# Patient Record
Sex: FEMALE | Race: WHITE | NOT HISPANIC OR LATINO | Employment: STUDENT | URBAN - METROPOLITAN AREA
[De-identification: names, ages, dates, MRNs, and addresses within clinical notes are randomized per-mention and may not be internally consistent; named-entity substitution may affect disease eponyms.]

---
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
## 2019-01-14 ENCOUNTER — TELEPHONE (OUTPATIENT)
Dept: OBSTETRICS AND GYNECOLOGY | Age: 19
End: 2019-01-14

## 2019-01-14 NOTE — TELEPHONE ENCOUNTER
Called pt. She has just been on pills for 3 months. She has not yet noted a significant improvement in flow and cramping. She is not having any issues with pills otherwise. Discussed that it may take additional months to note a significant improvement in flow and cramps. Recommend she consider continuing a few more months. She denies any significant headaches or side effects and agrees with this plan. Also instructed pt she has refills at pharmacy.

## 2019-01-28 ENCOUNTER — TELEPHONE (OUTPATIENT)
Dept: OBSTETRICS AND GYNECOLOGY | Age: 19
End: 2019-01-28

## 2019-01-28 NOTE — TELEPHONE ENCOUNTER
Dr Delgado pt started a new bcp, this is 4th month, has been exp low abd cramping different than menstr cramping, leaving this Thursday for 6 mos, wants to know if she can be seen before then. Would you want an US with this appt? Please advise

## 2019-01-29 ENCOUNTER — PROCEDURE VISIT (OUTPATIENT)
Dept: OBSTETRICS AND GYNECOLOGY | Age: 19
End: 2019-01-29

## 2019-01-29 ENCOUNTER — OFFICE VISIT (OUTPATIENT)
Dept: OBSTETRICS AND GYNECOLOGY | Age: 19
End: 2019-01-29

## 2019-01-29 VITALS
BODY MASS INDEX: 18.66 KG/M2 | DIASTOLIC BLOOD PRESSURE: 70 MMHG | SYSTOLIC BLOOD PRESSURE: 110 MMHG | HEIGHT: 65 IN | WEIGHT: 112 LBS

## 2019-01-29 DIAGNOSIS — N94.6 DYSMENORRHEA: Primary | ICD-10-CM

## 2019-01-29 DIAGNOSIS — R10.30 LOWER ABDOMINAL PAIN: Primary | ICD-10-CM

## 2019-01-29 DIAGNOSIS — N94.6 DYSMENORRHEA: ICD-10-CM

## 2019-01-29 DIAGNOSIS — R10.30 LOWER ABDOMINAL PAIN: ICD-10-CM

## 2019-01-29 LAB
B-HCG UR QL: NEGATIVE
BILIRUB BLD-MCNC: NEGATIVE MG/DL
CLARITY, POC: CLEAR
COLOR UR: YELLOW
GLUCOSE UR STRIP-MCNC: NEGATIVE MG/DL
INTERNAL NEGATIVE CONTROL: NEGATIVE
INTERNAL POSITIVE CONTROL: POSITIVE
KETONES UR QL: NEGATIVE
LEUKOCYTE EST, POC: NEGATIVE
Lab: NORMAL
NITRITE UR-MCNC: NEGATIVE MG/ML
PH UR: 5.5 [PH] (ref 5–8)
PROT UR STRIP-MCNC: ABNORMAL MG/DL
RBC # UR STRIP: NEGATIVE /UL
SP GR UR: 1.02 (ref 1–1.03)
UROBILINOGEN UR QL: NORMAL

## 2019-01-29 PROCEDURE — 81003 URINALYSIS AUTO W/O SCOPE: CPT | Performed by: PHYSICIAN ASSISTANT

## 2019-01-29 PROCEDURE — 76830 TRANSVAGINAL US NON-OB: CPT | Performed by: OBSTETRICS & GYNECOLOGY

## 2019-01-29 PROCEDURE — 81025 URINE PREGNANCY TEST: CPT | Performed by: PHYSICIAN ASSISTANT

## 2019-01-29 PROCEDURE — 99213 OFFICE O/P EST LOW 20 MIN: CPT | Performed by: PHYSICIAN ASSISTANT

## 2019-01-29 RX ORDER — NORETHINDRONE ACETATE AND ETHINYL ESTRADIOL AND FERROUS FUMARATE 1MG-20(24)
1 KIT ORAL DAILY
Qty: 84 TABLET | Refills: 3 | Status: SHIPPED | OUTPATIENT
Start: 2019-01-29 | End: 2019-08-14 | Stop reason: SDUPTHER

## 2019-01-29 RX ORDER — METHYLPHENIDATE HYDROCHLORIDE 54 MG/1
TABLET ORAL
Refills: 0 | COMMUNITY
Start: 2019-01-24

## 2019-01-29 NOTE — PROGRESS NOTES
"Subjective     Chief Complaint   Patient presents with   • Gynecologic Exam     c/o low abdominal cramping       Hitesh Garcia is a 19 y.o.  whose LMP is Patient's last menstrual period was 2019 (approximate). presents with pelvic pain    Pt is on BCP  Been on it for 3 months, just started her 4th month  started noting right lower quadrant discomfort in the past week  First noted it while lying on her side  Started getting worse over the weekend  No nausea/vomiting or fever.  Stops and starts on it's own  Not treated with otc meds  No h/o this    Pt of Dr Guardado  New to me with this concern    No Additional Complaints Reported    The following portions of the patient's history were reviewed and updated as appropriate:vital signs, allergies, current medications, past family history, past medical history, past social history, past surgical history and problem list      Review of Systems   Genitourinary:positive for pelvic pain on the right side     Objective      /70   Ht 163.8 cm (64.5\")   Wt 50.8 kg (112 lb)   LMP 2019 (Approximate)   Breastfeeding? No   BMI 18.93 kg/m²     Physical Exam    General:   alert, comfortable and no distress   Heart: Not performed today   Lungs: Not performed today.   Breast: Not performed today   Neck: na   Abdomen: BS nl in all 4 quadrants, non tender to palpation and neg SLR   CVA: Not performed today   Pelvis: Not performed today   Extremities: Not performed today   Neurologic: negative   Psychiatric: Normal affect, judgement, and mood       Lab Review   Labs: Urinalysis - with micro proteinuria noted    Imaging   Ultrasound - Pelvic Vaginal  Endo lining measures 5.26 mm.  b ovaries wnl    Assessment/Plan     ASSESSMENT  1. Lower abdominal pain    2. Dysmenorrhea        PLAN  1.   Orders Placed This Encounter   Procedures   • Urine Culture - Urine, Urine, Random Void   • POC Pregnancy, Urine   • POC Urinalysis Dipstick, Multipro   • CBC & Differential " "      2. Medications prescribed this encounter:        New Medications Ordered This Visit   Medications   • norethindrone-ethinyl estradiol-ferrous fumarate (LOESTIN 24 FE) 1-20 MG-MCG(24) per tablet     Sig: Take 1 tablet by mouth Daily.     Dispense:  84 tablet     Refill:  3     Take continuously so fill sufficiently       3. Discussed that U/S is reassuring but plan to r/o acute infection with a CBC and also r/o UTI.  Discussed options and plan to take pill continuously to see if that helps to decrease her discomfort. Also discussed ibuprofen prn pain.  Disc that if acute pain occurs or if  nausea and vomiting of fever    Disc SA. 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

## 2019-01-30 LAB
BASOPHILS # BLD AUTO: 0 X10E3/UL (ref 0–0.2)
BASOPHILS NFR BLD AUTO: 0 %
EOSINOPHIL # BLD AUTO: 0.1 X10E3/UL (ref 0–0.4)
EOSINOPHIL NFR BLD AUTO: 1 %
ERYTHROCYTE [DISTWIDTH] IN BLOOD BY AUTOMATED COUNT: 13.1 % (ref 12.3–15.4)
HCT VFR BLD AUTO: 39.9 % (ref 34–46.6)
HGB BLD-MCNC: 13.9 G/DL (ref 11.1–15.9)
IMM GRANULOCYTES # BLD AUTO: 0 X10E3/UL (ref 0–0.1)
IMM GRANULOCYTES NFR BLD AUTO: 0 %
LYMPHOCYTES # BLD AUTO: 1.3 X10E3/UL (ref 0.7–3.1)
LYMPHOCYTES NFR BLD AUTO: 17 %
MCH RBC QN AUTO: 30.2 PG (ref 26.6–33)
MCHC RBC AUTO-ENTMCNC: 34.8 G/DL (ref 31.5–35.7)
MCV RBC AUTO: 87 FL (ref 79–97)
MONOCYTES # BLD AUTO: 0.6 X10E3/UL (ref 0.1–0.9)
MONOCYTES NFR BLD AUTO: 8 %
NEUTROPHILS # BLD AUTO: 6 X10E3/UL (ref 1.4–7)
NEUTROPHILS NFR BLD AUTO: 74 %
PLATELET # BLD AUTO: 243 X10E3/UL (ref 150–379)
RBC # BLD AUTO: 4.6 X10E6/UL (ref 3.77–5.28)
WBC # BLD AUTO: 8 X10E3/UL (ref 3.4–10.8)

## 2019-01-31 LAB
BACTERIA UR CULT: NORMAL
BACTERIA UR CULT: NORMAL

## 2019-08-14 DIAGNOSIS — N94.6 DYSMENORRHEA: ICD-10-CM

## 2019-08-14 RX ORDER — NORETHINDRONE ACETATE AND ETHINYL ESTRADIOL AND FERROUS FUMARATE 1MG-20(24)
1 KIT ORAL DAILY
Qty: 84 TABLET | Refills: 1 | Status: SHIPPED | OUTPATIENT
Start: 2019-08-14 | End: 2020-01-20

## 2020-01-17 ENCOUNTER — TELEPHONE (OUTPATIENT)
Dept: OBSTETRICS AND GYNECOLOGY | Age: 20
End: 2020-01-17

## 2020-01-17 NOTE — TELEPHONE ENCOUNTER
PT states she has checked several Pharm and her BC is on Back order or has been D/C.  She sched appt  For when she returns from school in May.  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 Pt aware Dr. CHINCHILLA not in office today conf Pharm @ [facility].  Pls advise    LINDA # [phone]

## 2020-01-20 ENCOUNTER — TELEPHONE (OUTPATIENT)
Dept: OBSTETRICS AND GYNECOLOGY | Age: 20
End: 2020-01-20

## 2020-01-20 RX ORDER — NORETHINDRONE ACETATE AND ETHINYL ESTRADIOL 1; .02 MG/1; MG/1
1 TABLET ORAL DAILY
Qty: 28 TABLET | Refills: 4 | Status: SHIPPED | OUTPATIENT
Start: 2020-01-20 | End: 2020-04-08

## 2020-01-20 NOTE — TELEPHONE ENCOUNTER
Pt called and requested change in pill due to availability. Was on loestrin 24. Changed to lo estrin 1/20.

## 2020-01-20 NOTE — TELEPHONE ENCOUNTER
Patient called and is having trouble getting LoEstrin from her pharmacy .  They are out of stock and do not know when they will get it in.  Is wanting to know if you call in another birth control?

## 2020-01-20 NOTE — TELEPHONE ENCOUNTER
I changed to loestrin 1/20. Pt was on loestrin 24. There should be many formulations for the newly prescribed pill

## 2020-04-08 RX ORDER — NORETHINDRONE ACETATE AND ETHINYL ESTRADIOL 1; 20 MG/1; UG/1
TABLET ORAL
Qty: 21 TABLET | Refills: 0 | Status: SHIPPED | OUTPATIENT
Start: 2020-04-08 | End: 2020-04-28

## 2020-04-28 RX ORDER — NORETHINDRONE ACETATE AND ETHINYL ESTRADIOL 1; 20 MG/1; UG/1
TABLET ORAL
Qty: 21 TABLET | Refills: 0 | Status: SHIPPED | OUTPATIENT
Start: 2020-04-28 | End: 2020-05-18

## 2020-05-18 RX ORDER — NORETHINDRONE ACETATE AND ETHINYL ESTRADIOL 1; 20 MG/1; UG/1
TABLET ORAL
Qty: 21 TABLET | Refills: 0 | Status: SHIPPED | OUTPATIENT
Start: 2020-05-18 | End: 2020-05-19 | Stop reason: SDUPTHER

## 2020-05-19 ENCOUNTER — OFFICE VISIT (OUTPATIENT)
Dept: OBSTETRICS AND GYNECOLOGY | Age: 20
End: 2020-05-19

## 2020-05-19 VITALS
WEIGHT: 118.6 LBS | HEIGHT: 65 IN | BODY MASS INDEX: 19.76 KG/M2 | SYSTOLIC BLOOD PRESSURE: 130 MMHG | DIASTOLIC BLOOD PRESSURE: 80 MMHG

## 2020-05-19 DIAGNOSIS — Z01.419 ENCOUNTER FOR GYNECOLOGICAL EXAMINATION: Primary | ICD-10-CM

## 2020-05-19 DIAGNOSIS — N94.6 DYSMENORRHEA: ICD-10-CM

## 2020-05-19 PROCEDURE — 99395 PREV VISIT EST AGE 18-39: CPT | Performed by: OBSTETRICS & GYNECOLOGY

## 2020-05-19 RX ORDER — LEVOCETIRIZINE DIHYDROCHLORIDE 5 MG/1
5 TABLET, FILM COATED ORAL EVERY EVENING
COMMUNITY

## 2020-05-19 RX ORDER — FLUTICASONE PROPIONATE 50 MCG
2 SPRAY, SUSPENSION (ML) NASAL DAILY
COMMUNITY

## 2020-05-19 RX ORDER — NORETHINDRONE ACETATE AND ETHINYL ESTRADIOL 1; .02 MG/1; MG/1
1 TABLET ORAL DAILY
Qty: 21 TABLET | Refills: 14 | Status: SHIPPED | OUTPATIENT
Start: 2020-05-19 | End: 2021-04-22

## 2020-05-19 NOTE — PROGRESS NOTES
".  Subjective     Chief Complaint   Patient presents with   • Gynecologic Exam     ae       History of Present Illness    Hitesh Garcia is a 20 y.o.  who presents for annual exam  She notes she does not have menses while taking active ocp's continuously but will occasionally have spotting  Pt is not sexually active. She is taking pills to help with flow and cramping.  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 (Menstrual status: Oral contraceptives).         Current contraception: abstinence  History of abnormal Pap smear: no  Received Gardasil immunization: yes  Perform regular self breast exam: yes - reg  Family history of uterine or ovarian cancer: no  Family History of colon cancer: no  Family history of breast cancer: yes - PGM after menopause    Mammogram: not indicated.  Colonoscopy: not indicated.  DEXA: not indicated.    Exercise: moderately active  Calcium/Vitamin D: adequate intake    The following portions of the patient's history were reviewed and updated as appropriate: allergies, current medications, past family history, past medical history, past social history, past surgical history and problem list.    Review of Systems    Review of Systems   Constitutional: Negative for fatigue.   Respiratory: Negative for shortness of breath.    Gastrointestinal: Negative for abdominal pain.   Genitourinary: Pos for missed menses with continuous pill use,Negative for dysmenorrhea on ocp's  Neurological: Negative for headaches.   Psychiatric/Behavioral: Negative for dysphoric mood.         Objective   Physical Exam    /80   Ht 163.8 cm (64.5\")   " Wt 53.8 kg (118 lb 9.6 oz)   BMI 20.04 kg/m²   General:   alert and no distress   Heart: regular rate and rhythm   Lungs: clear to auscultation bilaterally   Breast: no masses, retractions, nipple discharge or axillary adenopathy   Neck: Supple, no thyromegaly   Abdomen: Soft, no guarding/rebound    No focal tenderness   Pelvis: External genitalia: normal general appearance  Urinary system: urethral meatus normal  Vaginal: normal mucosa without prolapse or lesions  Cervix: normal appearance  Adnexa: no masses or tenderness  Uterus: normal, nontender   Extremities: Extremities normal, atraumatic, no cyanosis or edema   Neurologic: Alert and oriented   Psychiatric: Normal affect, judgement, and mood   MA present throughout exam    Assessment/Plan   Hitesh was seen today for gynecologic exam.    Diagnoses and all orders for this visit:    Encounter for gynecological examination  -     IGP, Rfx Aptima HPV ASCU    Dysmenorrhea    Other orders  -     norethindrone-ethinyl estradiol (Junel 1/20) 1-20 MG-MCG per tablet; Take 1 tablet by mouth Daily.        All questions answered.  Breast self exam technique reviewed and patient encouraged to perform self-exam monthly.  Discussed healthy lifestyle modifications.  Recommended 30 minutes of aerobic exercise five times per week.    Reviewed risks of ocp use to include DVT/PE/CVE/HTN and gallbladder disease. Pt desires to continue.

## 2020-05-21 ENCOUNTER — TELEPHONE (OUTPATIENT)
Dept: OBSTETRICS AND GYNECOLOGY | Age: 20
End: 2020-05-21

## 2020-05-21 LAB
CONV .: NORMAL
CYTOLOGIST CVX/VAG CYTO: NORMAL
CYTOLOGY CVX/VAG DOC CYTO: NORMAL
CYTOLOGY CVX/VAG DOC THIN PREP: NORMAL
DX ICD CODE: NORMAL
HIV 1 & 2 AB SER-IMP: NORMAL
OTHER STN SPEC: NORMAL
STAT OF ADQ CVX/VAG CYTO-IMP: NORMAL

## 2020-05-21 NOTE — TELEPHONE ENCOUNTER
----- Message from Krys Guardado MD sent at 5/21/2020  3:28 PM EDT -----  Please call pt and inform her of normal pap result

## 2020-09-16 ENCOUNTER — TELEPHONE (OUTPATIENT)
Dept: OBSTETRICS AND GYNECOLOGY | Age: 20
End: 2020-09-16

## 2020-09-16 NOTE — TELEPHONE ENCOUNTER
Pt switched OCP to junel in May and shes been spotting for 2 months and is having to wear a panty liner every day. She is starting to get concerned. Does she need to switch to a different brand? Please advise

## 2020-09-17 NOTE — TELEPHONE ENCOUNTER
Spoke with pt. She states she is not sexually active. She is 2 hours away at school and is not wanting to come in. She is wondering if she can do a televisit with you. Please advise

## 2020-09-17 NOTE — TELEPHONE ENCOUNTER
Please call pt, recommend she take a pregnancy test if she is sexually active. If it is negative, recommend she make appt for further evaluation. If positive, advise her to call office immediately.

## 2020-09-18 ENCOUNTER — TELEPHONE (OUTPATIENT)
Dept: OBSTETRICS AND GYNECOLOGY | Age: 20
End: 2020-09-18

## 2020-09-18 NOTE — TELEPHONE ENCOUNTER
Pt called and discussed her symptoms. She has been taking active pills continuously. Discussed that breakthrough bleeding more common with this. She is not sexually active and has never been and denies pain or abnormal discharge. She prefers to avoid a visit as she is at school. Discussed that options would be to not take pills the last week and have a normal menses. If this results in resolution of spotting, she could have monthly menses rather than take continuous pills. If monthly menses are painful, discussed she could try every-other-month to see if that improves bleeding but avoids monthly cramping.

## 2021-04-22 RX ORDER — NORETHINDRONE ACETATE AND ETHINYL ESTRADIOL 1; .02 MG/1; MG/1
TABLET ORAL
Qty: 84 TABLET | Refills: 2 | Status: SHIPPED | OUTPATIENT
Start: 2021-04-22 | End: 2021-06-11 | Stop reason: SDUPTHER

## 2021-06-11 ENCOUNTER — OFFICE VISIT (OUTPATIENT)
Dept: OBSTETRICS AND GYNECOLOGY | Age: 21
End: 2021-06-11

## 2021-06-11 VITALS
SYSTOLIC BLOOD PRESSURE: 124 MMHG | BODY MASS INDEX: 19.26 KG/M2 | WEIGHT: 115.6 LBS | DIASTOLIC BLOOD PRESSURE: 60 MMHG | HEIGHT: 65 IN

## 2021-06-11 DIAGNOSIS — Z00.00 WELL WOMAN EXAM (NO GYNECOLOGICAL EXAM): Primary | ICD-10-CM

## 2021-06-11 DIAGNOSIS — N94.6 DYSMENORRHEA: ICD-10-CM

## 2021-06-11 PROCEDURE — 99395 PREV VISIT EST AGE 18-39: CPT | Performed by: OBSTETRICS & GYNECOLOGY

## 2021-06-11 RX ORDER — NORETHINDRONE ACETATE AND ETHINYL ESTRADIOL 1; .02 MG/1; MG/1
1 TABLET ORAL DAILY
Qty: 84 TABLET | Refills: 4 | Status: SHIPPED | OUTPATIENT
Start: 2021-06-11 | End: 2022-06-14 | Stop reason: SDUPTHER

## 2021-06-11 RX ORDER — DOXYCYCLINE HYCLATE 100 MG/1
CAPSULE ORAL
COMMUNITY
Start: 2021-06-10

## 2021-06-11 NOTE — PROGRESS NOTES
.  Subjective     Chief Complaint   Patient presents with   • Gynecologic Exam     AE Today, last AE 2020 (-). Pt on OCP and working well, pt has no complaints        History of Present Illness    Hitesh Garcia is a 21 y.o.  who presents for annual exam.  She usually does not have menses with continuous active ocps. She occasionally has some spotting. She does not have significant cramping.   She is planning to go on to get a masters in higher education and still considering programs.   She is not sexually active and has not been in past.    Obstetric History:  OB History        0    Para   0    Term   0       0    AB   0    Living   0       SAB   0    TAB   0    Ectopic   0    Molar   0    Multiple   0    Live Births   0               Menstrual History:     Patient's last menstrual period was 2021 (within days).         Current contraception: abstinence  History of abnormal Pap smear: no  Received Gardasil immunization: yes  Perform regular self breast exam: yes - reg  Family history of uterine or ovarian cancer: no  Family History of colon cancer: no  Family history of breast cancer: yes - PGM after menopause    Mammogram: not indicated.  Colonoscopy: not indicated.  DEXA: not indicated.    Exercise: moderately active  Calcium/Vitamin D: adequate intake    The following portions of the patient's history were reviewed and updated as appropriate: allergies, current medications, past family history, past medical history, past social history, past surgical history and problem list.    Review of Systems    Review of Systems   Constitutional: Negative for fatigue.   Respiratory: Negative for shortness of breath.    Gastrointestinal: Negative for abdominal pain.   Genitourinary: Negative for dysuria. Negative for excessive menses or cramping  Neurological: Negative for headaches.   Psychiatric/Behavioral: Negative for dysphoric mood.         Objective   Physical Exam    /60   Ht 163.8 cm  "(64.5\")   Wt 52.4 kg (115 lb 9.6 oz)   LMP 05/26/2021 (Within Days)   Breastfeeding No   BMI 19.54 kg/m²   General:   alert and no distress   Heart: regular rate and rhythm   Lungs: clear to auscultation bilaterally   Breast: Inspection negative; no masses, retractions, nipple discharge or axillary adenopathy   Neck: Supple, no thyromegaly   Abdomen: soft, non-tender. No masses,  no organomegaly   Pelvis: Not performed today, pt declines   Extremities: Extremities normal, atraumatic, no cyanosis or edema   Neurologic: Alert and oriented   Psychiatric: Normal affect, judgement, and mood     Assessment/Plan   Diagnoses and all orders for this visit:    1. Well woman exam (no gynecological exam) (Primary)    2. Dysmenorrhea    Other orders  -     norethindrone-ethinyl estradiol (MICROGESTIN 1/20) 1-20 MG-MCG per tablet; Take 1 tablet by mouth Daily.  Dispense: 84 tablet; Refill: 4        All questions answered.  Breast self exam technique reviewed and patient encouraged to perform self-exam monthly.  Discussed healthy lifestyle modifications.  Recommended 30 minutes of aerobic exercise five times per week.  Pt defers pelvic exam and pap is up to date    Pt desires to continue ocp's for dysmenorrhea; she understand             "

## 2021-10-05 ENCOUNTER — TELEPHONE (OUTPATIENT)
Dept: OBSTETRICS AND GYNECOLOGY | Age: 21
End: 2021-10-05

## 2021-12-22 ENCOUNTER — IMMUNIZATION (OUTPATIENT)
Dept: VACCINE CLINIC | Facility: HOSPITAL | Age: 21
End: 2021-12-22

## 2021-12-22 PROCEDURE — 91300 HC SARSCOV02 VAC 30MCG/0.3ML IM: CPT | Performed by: INTERNAL MEDICINE

## 2021-12-22 PROCEDURE — 0004A HC ADM SARSCOV2 30MCG/0.3ML BOOSTER: CPT | Performed by: INTERNAL MEDICINE

## 2022-06-14 ENCOUNTER — OFFICE VISIT (OUTPATIENT)
Dept: OBSTETRICS AND GYNECOLOGY | Age: 22
End: 2022-06-14

## 2022-06-14 VITALS
SYSTOLIC BLOOD PRESSURE: 118 MMHG | DIASTOLIC BLOOD PRESSURE: 50 MMHG | HEIGHT: 65 IN | BODY MASS INDEX: 20.49 KG/M2 | WEIGHT: 123 LBS

## 2022-06-14 DIAGNOSIS — Z00.00 ENCOUNTER FOR WELL WOMAN EXAM WITHOUT GYNECOLOGICAL EXAM: Primary | ICD-10-CM

## 2022-06-14 DIAGNOSIS — N94.6 DYSMENORRHEA: ICD-10-CM

## 2022-06-14 PROCEDURE — 99395 PREV VISIT EST AGE 18-39: CPT | Performed by: OBSTETRICS & GYNECOLOGY

## 2022-06-14 RX ORDER — NORETHINDRONE ACETATE AND ETHINYL ESTRADIOL 1; .02 MG/1; MG/1
1 TABLET ORAL DAILY
Qty: 84 TABLET | Refills: 5 | Status: SHIPPED | OUTPATIENT
Start: 2022-06-14

## 2022-06-14 NOTE — PROGRESS NOTES
".  Subjective     Chief Complaint   Patient presents with   • Gynecologic Exam     Annual exam, Last Pap 2020 NEG, pt has no complaints today        History of Present Illness    Hitesh Garcia is a 22 y.o.  who presents for annual exam.  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   She is thinking of moving to [place] next year    Obstetric History:  OB History        0    Para   0    Term   0       0    AB   0    Living   0       SAB   0    IAB   0    Ectopic   0    Molar   0    Multiple   0    Live Births   0               Menstrual History:     No LMP recorded (lmp unknown). (Menstrual status: Oral contraceptives).         Current contraception: abstinence  History of abnormal Pap smear: no  Received Gardasil immunization: yes  Perform regular self breast exam: yes - reg  Family history of uterine or ovarian cancer: no  Family History of colon cancer: no  Family history of breast cancer: yes - PGM-postmenopausal    Mammogram: not indicated.  Colonoscopy: not indicated.  DEXA: not indicated.    Exercise: moderately active  Calcium/Vitamin D: adequate intake    The following portions of the patient's history were reviewed and updated as appropriate: allergies, current medications, past family history, past medical history, past social history, past surgical history and problem list.    Review of Systems    Review of Systems   Constitutional: Negative for fatigue.   Respiratory: Negative for shortness of breath.    Gastrointestinal: Negative for abdominal pain.   Genitourinary: Negative for abnormal bleeding or cramping  Neurological: Negative for headaches.   Psychiatric/Behavioral: Negative for dysphoric mood.         Objective   Physical Exam    /50   Ht 163.8 cm (64.5\")   Wt 55.8 kg (123 lb)   LMP  (LMP Unknown)   Breastfeeding " No   BMI 20.79 kg/m²   General:   Alert, in no distress   Heart: regular rate and rhythm   Lungs: clear to auscultation bilaterally   Breast: Inspection negative; no masses, retractions, nipple discharge or axillary adenopathy in either breast   Neck: Supple, no thyromegaly   Abdomen: Soft, no tenderness or guarding   Pelvis: Pt unable to tolerate exam today   Extremities: Normal without edema   Neurologic: Alert and oriented   Psychiatric: Normal affect, judgment and mood     MA present during exam    Assessment & Plan   Diagnoses and all orders for this visit:    1. Encounter for well woman exam without gynecological exam (Primary)    2. Dysmenorrhea    Other orders  -     norethindrone-ethinyl estradiol (MICROGESTIN 1/20) 1-20 MG-MCG per tablet; Take 1 tablet by mouth Daily. To take continuous active pills  Dispense: 84 tablet; Refill: 5        All questions answered.  Breast self exam technique reviewed and patient encouraged to perform self-exam monthly.  Discussed healthy lifestyle modifications.  Recommended 30 minutes of aerobic exercise five times per week.  Pap deferred as pt unable to tolerate exam. She has not been able to use tampons either. Discussed training at home with dilators to assist pelvic relaxation. Pt notes understanding. She will consider and call back to schedule pelvic exam if she would like to try again.     The risks of combined birth control pills were reviewed with the patient to include DVT/PE/CVE/HTN/biliary disease and breast cancer. She notes understanding.

## 2023-06-19 ENCOUNTER — OFFICE VISIT (OUTPATIENT)
Dept: OBSTETRICS AND GYNECOLOGY | Age: 23
End: 2023-06-19
Payer: COMMERCIAL

## 2023-06-19 VITALS
BODY MASS INDEX: 19.96 KG/M2 | SYSTOLIC BLOOD PRESSURE: 112 MMHG | DIASTOLIC BLOOD PRESSURE: 62 MMHG | HEIGHT: 65 IN | WEIGHT: 119.8 LBS

## 2023-06-19 DIAGNOSIS — N94.2 VAGINISMUS: ICD-10-CM

## 2023-06-19 DIAGNOSIS — Z91.89 AT HIGH RISK FOR BREAST CANCER: ICD-10-CM

## 2023-06-19 DIAGNOSIS — N94.6 DYSMENORRHEA: ICD-10-CM

## 2023-06-19 DIAGNOSIS — Z01.419 ENCOUNTER FOR GYNECOLOGICAL EXAMINATION: Primary | ICD-10-CM

## 2023-06-19 PROCEDURE — 99395 PREV VISIT EST AGE 18-39: CPT | Performed by: OBSTETRICS & GYNECOLOGY

## 2023-06-19 RX ORDER — NORETHINDRONE ACETATE AND ETHINYL ESTRADIOL 1; .02 MG/1; MG/1
1 TABLET ORAL DAILY
Qty: 84 TABLET | Refills: 5 | Status: SHIPPED | OUTPATIENT
Start: 2023-06-19

## 2023-06-19 NOTE — PROGRESS NOTES
".Subjective     Chief Complaint   Patient presents with    Gynecologic Exam     Annual exam, Last Pap 2020 NEG, Pt has no complaints today        History of Present Illness    Hitesh Garcia is a 23 y.o.  who presents for annual exam.  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 (Menstrual status: Oral contraceptives).         Current contraception: abstinence  History of abnormal Pap smear:  no  Received Gardasil immunization: yes  Perform regular self breast exam:  yes  Family history of uterine or ovarian cancer: no  Family History of colon cancer: no  Family history of breast cancer: yes - PGM  -dx in her 50's and pat aunt in her 50's    Mammogram: not indicated.  Colonoscopy: not indicated.  DEXA: not indicated.    Exercise: moderately active  Calcium/Vitamin D: adequate intake    The following portions of the patient's history were reviewed and updated as appropriate: allergies, current medications, past family history, past medical history, past social history, past surgical history, and problem list.    Review of Systems    Review of Systems   Constitutional: Negative for fatigue.   Respiratory: Negative for shortness of breath.    Gastrointestinal: Negative for abdominal pain.   Genitourinary: Negative for heavy bleeding or cramping with ocp's  Neurological: Negative for headaches.   Psychiatric/Behavioral: Negative for dysphoric mood.         Objective   Physical Exam    /62   Ht 163.8 cm (64.5\")   Wt 54.3 kg (119 lb 12.8 oz)   LMP  (LMP Unknown)   BMI " 20.25 kg/m²   General:   Alert, in no distress   Heart: regular rate and rhythm   Lungs: clear to auscultation bilaterally   Breast: Inspection negative; no masses, retractions, nipple discharge or axillary adenopathy in either breast   Neck: Supple, no thyromegaly   Abdomen: Soft, no tenderness or guarding   Pelvis: Pt unable to tolerate pelvic or speculum exam    Extremities: Normal without edema   Neurologic: Alert and oriented   Psychiatric: Normal affect, judgment and mood     MA present for entire exam. Discussed pelvic exam   Assessment & Plan   Diagnoses and all orders for this visit:    1. Encounter for gynecological examination (Primary)    2. Dysmenorrhea    3. Vaginismus  -     Ambulatory Referral to Physical Therapy Pelvic Floor    4. At high risk for breast cancer  -     Ambulatory Referral to High [facility] ([facility])    Other orders  -     norethindrone-ethinyl estradiol (MICROGESTIN 1/20) 1-20 MG-MCG per tablet; Take 1 tablet by mouth Daily. To take continuous active pills  Dispense: 84 tablet; Refill: 5        All questions answered.  Breast self exam technique reviewed and patient encouraged to perform self-exam monthly.  Discussed healthy lifestyle modifications.  Recommended 30 minutes of aerobic exercise five times per week.  Pt desires to continue ocp's for hx of dysmenorrhea. The risks of combined birth control pills were reviewed with the patient to include DVT/PE/CVE/HTN/biliary disease and breast cancer. She notes understanding.     Recommend pelvic floor PT as pt is unable to tolerate pelvic or speculum exam. Discussed vaginal dilators/inserting tampons at last visit but pt notes she has avoided this due to inability to relax her pelvic floor muscles. Recommend f/u visit 3 months to assess progress with PT and possibly proceed with exam/pap smear.     Calculated breast cancer risk with new family history and pt is high risk. Imaging would not be indicated as earliest cancer in 50's but  recommend consult with high risk clinic to discuss risk reducing strategies and for additional surveillance exam. Pt agrees.

## 2023-08-07 RX ORDER — NORETHINDRONE ACETATE AND ETHINYL ESTRADIOL 1; 20 MG/1; UG/1
TABLET ORAL
Qty: 84 TABLET | Refills: 5 | Status: SHIPPED | OUTPATIENT
Start: 2023-08-07

## 2023-11-21 ENCOUNTER — OFFICE VISIT (OUTPATIENT)
Dept: OBSTETRICS AND GYNECOLOGY | Age: 23
End: 2023-11-21
Payer: COMMERCIAL

## 2023-11-21 VITALS
SYSTOLIC BLOOD PRESSURE: 112 MMHG | HEIGHT: 65 IN | WEIGHT: 126 LBS | BODY MASS INDEX: 20.99 KG/M2 | DIASTOLIC BLOOD PRESSURE: 64 MMHG

## 2023-11-21 DIAGNOSIS — Z01.419 VISIT FOR PELVIC EXAM: Primary | ICD-10-CM

## 2023-11-21 DIAGNOSIS — N94.2 VAGINISMUS: ICD-10-CM

## 2023-11-21 NOTE — PROGRESS NOTES
"Subjective     Chief Complaint   Patient presents with    Follow-up     Follow up from visit on 2023 has not done PT and possibly proceed with exam/pap.       Hitesh Garcia is a 23 y.o.  whose LMP is No LMP recorded. (Menstrual status: Oral contraceptives). presents to attempt her pelvic exam    Hitesh is here for a f/u  She was last seen in  by Dr Guardado  A pelvic exam was attempted at that time but she was unable to tolerate it  She was referred to PT but never went  Just got busy  She is here to attempt another exam today    She does have a therapist  Has worked with her and talked with her about this exam    Denies any h/o trauma  Has only used a tampon once and couldn't tolerate it  Has never touched herself of explored the area  Is just \"cut off\" from that part of her body    She does desire to be SA one day    No Additional Complaints Reported    The following portions of the patient's history were reviewed and updated as appropriate:no additional history reviewed, vital signs, allergies, current medications, past medical history, past social history, past surgical history, and problem list      Review of Systems   Genitourinary:positive for pelvic pain/intolerance to physical exam     Objective      /64   Ht 163.8 cm (64.5\")   Wt 57.2 kg (126 lb)   BMI 21.29 kg/m²     Physical Exam    General:   alert, comfortable, and no distress   Heart: Not performed today   Lungs: Not performed today.   Breast: Not performed today   Neck: Not performed today   Abdomen: Not performed today   CVA: Not performed today   Pelvis: External genitalia: normal general appearance  Vaginal: unable to be assessed  Cervix: unable to be assessed  Adnexa: normal bimanual exam  Uterus: normal single, nontender   Extremities: Not performed today   Neurologic: negative   Psychiatric: anxious, tearful       Lab Review   Labs: No data reviewed    Imaging   No data reviewed    Assessment & Plan     ASSESSMENT  1. Visit " for pelvic exam    2. Vaginismus          PLAN  1.   Orders Placed This Encounter   Procedures    Ambulatory Referral to Physical Therapy Evaluate and treat       2. Unable to tolerate exam. Pelvic exam only. She did not tolerate the speculum. Exam was terminated. I had a long d/w pt about physical therapy and essential need for this.   I do suggest she self explore and try to feel comfortable with her female anatomy. Podcasts would be helpful as well. She is open to trying PT again but I do believe this will be challenging as well.     Follow up: THOMAS Grace  11/21/2023

## 2024-07-29 RX ORDER — NORETHINDRONE ACETATE AND ETHINYL ESTRADIOL .02; 1 MG/1; MG/1
1 TABLET ORAL DAILY
Qty: 84 TABLET | Refills: 5 | Status: SHIPPED | OUTPATIENT
Start: 2024-07-29

## 2024-10-08 ENCOUNTER — OFFICE VISIT (OUTPATIENT)
Dept: OBSTETRICS AND GYNECOLOGY | Age: 24
End: 2024-10-08
Payer: COMMERCIAL

## 2024-10-08 VITALS
DIASTOLIC BLOOD PRESSURE: 68 MMHG | BODY MASS INDEX: 22.82 KG/M2 | SYSTOLIC BLOOD PRESSURE: 114 MMHG | HEIGHT: 65 IN | WEIGHT: 137 LBS

## 2024-10-08 DIAGNOSIS — Z01.419 WELL WOMAN EXAM WITH ROUTINE GYNECOLOGICAL EXAM: Primary | ICD-10-CM

## 2024-10-08 DIAGNOSIS — Z91.89 AT HIGH RISK FOR BREAST CANCER: ICD-10-CM

## 2024-10-08 PROCEDURE — 99395 PREV VISIT EST AGE 18-39: CPT | Performed by: PHYSICIAN ASSISTANT

## 2024-10-08 RX ORDER — CLINDAMYCIN PHOSPHATE 10 UG/ML
LOTION TOPICAL 2 TIMES DAILY
COMMUNITY

## 2024-10-08 NOTE — PROGRESS NOTES
Subjective     Chief Complaint   Patient presents with    Annual Exam     Annual exam last pap 2020 neg/no hpv done.       History of Present Illness    Hitesh Garcia is a 24 y.o.  who presents for annual exam.    She is here for routine exam    Has h/o difficulty with pelvic exams  She did try PT a few times and bought tampons to try but has not been successful  Declines waist down exam today    Happy with pills  Rare but regular menses    Is still getting therapy    Moving to [place]  Will plan to establish care down there    Her menses are regular every 28-30 days, lasting 4-7 days, dysmenorrhea none   Obstetric History:  OB History          0    Para   0    Term   0       0    AB   0    Living   0         SAB   0    IAB   0    Ectopic   0    Molar   0    Multiple   0    Live Births   0               Menstrual History:     Patient's last menstrual period was 2024 (approximate).         Current contraception: abstinence  History of abnormal Pap smear:  never had one  Received Gardasil immunization: yes  Perform regular self breast exam: yes - occl  Family history of uterine or ovarian cancer: no  Family History of colon cancer: no  Family history of breast cancer: yes - PGM and paternal aunt    Mammogram: not indicated.  Colonoscopy: not indicated.  DEXA: not indicated.    Exercise: moderately active  Calcium/Vitamin D: adequate intake    The following portions of the patient's history were reviewed and updated as appropriate: allergies, current medications, past family history, past medical history, past social history, past surgical history, and problem list.    Review of Systems   All other systems reviewed and are negative.      Review of Systems   Constitutional: Negative for fatigue.   Respiratory: Negative for shortness of breath.    Gastrointestinal: Negative for abdominal pain.   Genitourinary: Negative for dysuria.   Neurological: Negative for headaches.  "  Psychiatric/Behavioral: Negative for dysphoric mood.         Objective   Physical Exam    /68   Ht 163.8 cm (64.5\")   Wt 62.1 kg (137 lb)   LMP 08/26/2024 (Approximate) Comment: end of august through --mid september, longer than normal but for got to take a pill.  BMI 23.15 kg/m²   General:   alert, comfortable, and no distress   Heart: regular rate and rhythm   Lungs: clear to auscultation bilaterally   Breast: normal appearance, no masses or tenderness, Inspection negative, No nipple retraction or dimpling, No nipple discharge or bleeding, No axillary or supraclavicular adenopathy, Normal to palpation without dominant masses   Neck: no adenopathy and no carotid bruit   Abdomen: Not performed today   CVA: Not performed today   Pelvis: Not performed today   Extremities: Not performed today   Neurologic: Not performed today   Psychiatric: Normal affect, judgement, and mood, anxious     Assessment & Plan   Diagnoses and all orders for this visit:    1. Well woman exam with routine gynecological exam (Primary)    2. At high risk for breast cancer        All questions answered.  Breast self exam technique reviewed and patient encouraged to perform self-exam monthly.  Discussed healthy lifestyle modifications.  Recommended 30 minutes of aerobic exercise five times per week.  Discussed calcium needs to prevent osteoporosis.    Pap not done d/t pt intolerance  Refilled BCP                "

## 2025-01-10 RX ORDER — NORETHINDRONE ACETATE AND ETHINYL ESTRADIOL .02; 1 MG/1; MG/1
1 TABLET ORAL DAILY
Qty: 84 TABLET | Refills: 5 | Status: SHIPPED | OUTPATIENT
Start: 2025-01-10

## 2025-01-10 NOTE — TELEPHONE ENCOUNTER
Caller: Hitesh Garcia    Relationship: Self    Best call back number: [phone] / LVM    Requested Prescriptions: norethindrone-ethinyl estradiol (Junel 1/20) 1-20 MG-MCG per tablet     Pharmacy where request should be sent: [facility] - NOT [facility]    Last office visit with prescribing clinician: 10/8/2024   Last telemedicine visit with prescribing clinician: Visit date not found   Next office visit with prescribing clinician: Visit date not found     Additional details provided by patient: PLEASE CALL THE PT TO CONFIRM THAT THIS REQUEST WILL BE SENT TO [facility]    Does the patient have less than a 3 day supply:  [] Yes  [x] No    Would you like a call back once the refill request has been completed: [x] Yes [] No    If the office needs to give you a call back, can they leave a voicemail: [x] Yes [] No    Muna Estrella Rep   01/10/25 11:46 EST

## 2025-05-16 ENCOUNTER — TELEPHONE (OUTPATIENT)
Dept: OBSTETRICS AND GYNECOLOGY | Age: 25
End: 2025-05-16
Payer: COMMERCIAL

## 2025-05-16 RX ORDER — NORETHINDRONE ACETATE AND ETHINYL ESTRADIOL .02; 1 MG/1; MG/1
1 TABLET ORAL DAILY
Qty: 84 TABLET | Refills: 3 | Status: SHIPPED | OUTPATIENT
Start: 2025-05-16